# Patient Record
Sex: MALE | Employment: FULL TIME | URBAN - METROPOLITAN AREA
[De-identification: names, ages, dates, MRNs, and addresses within clinical notes are randomized per-mention and may not be internally consistent; named-entity substitution may affect disease eponyms.]

---

## 2017-06-23 DIAGNOSIS — Z02.5 SPORTS PHYSICAL: Primary | ICD-10-CM

## 2017-09-11 DIAGNOSIS — Z02.5 SPORTS PHYSICAL: Primary | ICD-10-CM

## 2017-09-12 ENCOUNTER — IMAGING SERVICES (OUTPATIENT)
Dept: GENERAL RADIOLOGY | Age: 22
End: 2017-09-12
Attending: INTERNAL MEDICINE

## 2017-09-12 ENCOUNTER — IMAGING SERVICES (OUTPATIENT)
Dept: CV DIAGNOSTICS | Age: 22
End: 2017-09-12
Attending: INTERNAL MEDICINE

## 2017-09-12 DIAGNOSIS — Z02.5 SPORTS PHYSICAL: ICD-10-CM

## 2017-09-12 LAB
AORTIC VALVE AREA: 4.3 CM2
ASCENDING AORTA (AAD): 3.3 CM
AV MEAN GRADIENT (AVMG): 2 MMHG
AV PEAK GRADIENT (AVPG): 3.7 MMHG
AV PEAK VELOCITY (AVPV): 1 M/S
DOP CALC LVOT PEAK VEL (LVOTPV): 0.8 M/S
E WAVE DECELARATION TIME (MDT): 188.5 MS
EST RIGHT VENT SYSTOLIC PRESSURE BY TRICUSPID REGURGITATION JET (RVSP): 21.3 MMHG
INTERVENTRICULAR SEPTUM IN END DIASTOLE (IVSD): 1 CM
LEFT INTERNAL DIMENSION IN SYSTOLE (LVSD): 3.7 CM
LEFT VENTRICULAR INTERNAL DIMENSION IN DIASTOLE (LVDD): 5.2 CM
LEFT VENTRICULAR POSTERIOR WALL IN END DIASTOLE (LVPW): 1 CM
LV EF: 58 %
LV END SYSTOLIC LONGITUDINAL STRAIN GLOBAL (LVGS): -15 %
LVOT 2D (LVOTD): 2.6 CM
LVOT VTI (LVOTVTI): 14.7 CM
MV E TISSUE VEL MED (MESV): 8.1 CM/S
MV E WAVE VEL/E TISSUE VEL MED(MSR): 5.3
MV PEAK A VELOCITY (MVPAV): 0.4 M/S
MV PEAK E VELOCITY (MVPEV): 0.4 M/S
RV TISSUE DOPPLER FREE WALL HEART RATE (RVSTV): 0.1 M/S
TRICUSPID VALVE PEAK REGURGITATION VELOCITY (TRPV): 2.1 M/S
TV ESTIMATED RIGHT ARTERIAL PRESSURE (RAP): 3 MMHG

## 2017-09-12 PROCEDURE — 93306 TTE W/DOPPLER COMPLETE: CPT | Performed by: INTERNAL MEDICINE

## 2017-09-12 PROCEDURE — 93351 STRESS TTE COMPLETE: CPT | Performed by: INTERNAL MEDICINE

## 2017-09-12 PROCEDURE — 0399T ECHO M-MODE/2D/DOPPLER (ROUTINE): CPT | Performed by: INTERNAL MEDICINE

## 2017-09-25 ENCOUNTER — OFFICE VISIT (OUTPATIENT)
Dept: SPORTS MEDICINE | Facility: CLINIC | Age: 22
End: 2017-09-25

## 2017-09-25 ENCOUNTER — HOSPITAL ENCOUNTER (OUTPATIENT)
Dept: CARDIOLOGY | Facility: CLINIC | Age: 22
Discharge: HOME | End: 2017-09-25

## 2017-09-25 ENCOUNTER — TELEPHONE (OUTPATIENT)
Dept: SPORTS MEDICINE | Facility: CLINIC | Age: 22
End: 2017-09-25

## 2017-09-25 ENCOUNTER — HOSPITAL ENCOUNTER (OUTPATIENT)
Dept: RADIOLOGY | Facility: HOSPITAL | Age: 22
Discharge: HOME | End: 2017-09-25
Attending: ORTHOPAEDIC SURGERY

## 2017-09-25 ENCOUNTER — OFFICE VISIT (OUTPATIENT)
Dept: CARDIOLOGY | Facility: CLINIC | Age: 22
End: 2017-09-25

## 2017-09-25 VITALS
BODY MASS INDEX: 27.02 KG/M2 | HEIGHT: 78 IN | HEART RATE: 72 BPM | DIASTOLIC BLOOD PRESSURE: 76 MMHG | WEIGHT: 233.56 LBS | SYSTOLIC BLOOD PRESSURE: 120 MMHG

## 2017-09-25 DIAGNOSIS — M25.562 LEFT KNEE PAIN, UNSPECIFIED CHRONICITY: ICD-10-CM

## 2017-09-25 DIAGNOSIS — Z86.79 HISTORY OF HEART MURMUR IN CHILDHOOD: ICD-10-CM

## 2017-09-25 DIAGNOSIS — Z02.5 ROUTINE SPORTS PHYSICAL EXAM: Primary | ICD-10-CM

## 2017-09-25 DIAGNOSIS — R01.1 MURMUR, CARDIAC: ICD-10-CM

## 2017-09-25 DIAGNOSIS — M25.562 LEFT KNEE PAIN, UNSPECIFIED CHRONICITY: Primary | ICD-10-CM

## 2017-09-25 DIAGNOSIS — I36.9 NONRHEUMATIC TRICUSPID VALVE DISORDER: ICD-10-CM

## 2017-09-25 DIAGNOSIS — Z02.5 ROUTINE SPORTS PHYSICAL EXAM: ICD-10-CM

## 2017-09-25 DIAGNOSIS — Z00.00 ROUTINE GENERAL MEDICAL EXAMINATION AT A HEALTH CARE FACILITY: Primary | ICD-10-CM

## 2017-09-25 DIAGNOSIS — E78.00 HYPERCHOLESTEROLEMIA: ICD-10-CM

## 2017-09-25 LAB
DIASTOLIC DYSFUNCTION: NO
ESTIMATED PA SYSTOLIC PRESSURE: 28.2
MITRAL VALVE REGURGITATION: NORMAL
RETIRED EF AND QEF - SEE NOTES: 63 (ref 55–65)
TRICUSPID VALVE REGURGITATION: NORMAL

## 2017-09-25 PROCEDURE — 93320 DOPPLER ECHO COMPLETE: CPT | Mod: 26,S$PBB,, | Performed by: INTERNAL MEDICINE

## 2017-09-25 PROCEDURE — 73721 MRI JNT OF LWR EXTRE W/O DYE: CPT | Mod: 26,LT,, | Performed by: RADIOLOGY

## 2017-09-25 PROCEDURE — 93325 DOPPLER ECHO COLOR FLOW MAPG: CPT | Mod: PBBFAC | Performed by: INTERNAL MEDICINE

## 2017-09-25 PROCEDURE — 99244 OFF/OP CNSLTJ NEW/EST MOD 40: CPT | Mod: S$PBB,,, | Performed by: INTERNAL MEDICINE

## 2017-09-25 PROCEDURE — 99204 OFFICE O/P NEW MOD 45 MIN: CPT | Mod: S$PBB,,, | Performed by: FAMILY MEDICINE

## 2017-09-25 PROCEDURE — 73564 X-RAY EXAM KNEE 4 OR MORE: CPT | Mod: 26,50,, | Performed by: RADIOLOGY

## 2017-09-25 PROCEDURE — 73564 X-RAY EXAM KNEE 4 OR MORE: CPT | Mod: TC,50,PO

## 2017-09-25 PROCEDURE — 99999 PR PBB SHADOW E&M-NEW PATIENT-LVL I: CPT | Mod: PBBFAC,,, | Performed by: ORTHOPAEDIC SURGERY

## 2017-09-25 PROCEDURE — 93351 STRESS TTE COMPLETE: CPT | Mod: 26,S$PBB,, | Performed by: INTERNAL MEDICINE

## 2017-09-25 PROCEDURE — 73721 MRI JNT OF LWR EXTRE W/O DYE: CPT | Mod: TC,LT

## 2017-09-25 PROCEDURE — 99201 *HC E&M-NEW PATIENT-LVL I: CPT | Mod: PBBFAC,25,27,PO | Performed by: ORTHOPAEDIC SURGERY

## 2017-09-25 PROCEDURE — 99999 PR PBB SHADOW E&M-EST. PATIENT-LVL III: CPT | Mod: PBBFAC,,, | Performed by: INTERNAL MEDICINE

## 2017-09-25 PROCEDURE — 99499 UNLISTED E&M SERVICE: CPT | Mod: S$PBB,,, | Performed by: ORTHOPAEDIC SURGERY

## 2017-09-25 PROCEDURE — 99213 OFFICE O/P EST LOW 20 MIN: CPT | Mod: PBBFAC,25,PO | Performed by: INTERNAL MEDICINE

## 2017-09-25 NOTE — PROGRESS NOTES
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  He played in the [employer] for [employer] and has been in the [employer]-League most recently.    He describes several issues with his left knee.  He missed 10 games when he was with [employer] in October 2015 with what he reports as a bone bruise from an MRI.  His knee was drained then and he was able to play the rest of the season.  In December 2016, he had swelling in that knee and saw Dr. Stephens in [place] and was told he had a cartilage injury and was given a PRP injection.  He was able to return to play following that.  He had some pain, swelling and popping this summer and saw Dr. Stephens again and got another MRI.  He was given a cortisone injection by his report and was told he could need surgery in the future to repair the cartilage which would keep him out 6-8 months.  He currently has no complaints and has been playing without issues.    ORTHOPAEDIC HISTORY:     1.  Left knee as reported above.      PHYSICAL EXAM:       GEN: Alert and oriented x3. In no apparent distress.   Well-developed, well-nourished male. Observation of ears, eyes, and nose   reveal no gross abnormalities.     CERVICAL SPINE: Full range of motion with no deficits.     BILATERAL SHOULDER EXAM: shows full symmetric range of motion with 5/5   strength throughout the supraspinatus and infraspinatus, deltoid, and   subscapularis. No evidence of instability.     BILATERAL ELBOW EXAM: Shows full and symmetric extension/flexion, and   pronation/supination, and varus/valgus stability. Negative posterolateral   rotatory instability.     BILATERAL WRIST EXAM: Shows normal and symmetric full flexion/extension   and radial/ulnar deviation.     HAND EXAM: Shows full range of motion to bilateral hands and full   strength and stability to all  fingers.     LUMBAR SPINE EXAM: Shows no evidence of any scoliosis. He has full   flexion and extension with no pain and no apparent tenderness to the   spine. Negative straight leg raise bilaterally.     HIP EXAM: Shows full range of motion without pain or signs of impingement.   Symmetric internal rotation without pain.  Has 5/5 hip flexion strength and 5/5 strength testing to the entire lower extremity.     KNEE EXAM: Examination of bilateral knees shows symmetric range of motion   0 to 145 degrees with negative Lachman and stable to varus-valgus stress   testing. No joint line tenderness. Good quad tone. No effusion of either knee.     FOOT AND ANKLE EXAM: Shows good range of motion to bilateral ankles with   no neurologic deficit. There is a 5/5 strength exam throughout the foot   and ankle exam. There is a normal arch on both feet. There is no tenderness to palpation along either feet.     IMAGING:     X-rays including standing, weight bearing AP and flexion bilateral knees, lateral and merchant views ordered and images reviewed by me show:    No fracture, dislocation or other pathology   Medial compartment: mild degenerative changes with subchondral possible OCD   Lateral compartment: no degenerative changes   Patellofemoral compartment: no degenerative changes        MRI of the left knee images reviewed by me, from today:  LFC partial to small full thickness cartilage injury with underlying bone edema  Degenerative fraying lateral meniscus         INACTIVE PROBLEMS:     None        ACTIVE PROBLEMS:     Left knee:  He has had recurrent issues with his left knee and has been seen by an outside orthopaedist for this as recently as this summer.  She recently underwent an injection in his left knee and has been told he could need surgery in the future if his symptoms return and/or worsen.  He is currently able to play and has been playing.        ASSESSMENT:     Ridge is given orthopaedic clearance for  participation for the [facility] for the 2014-15 Season.    With his left knee history, he is at risk for a recurrence or worsening of his left knee symptoms, which could involve him missing a short period of time or undergoing surgery and missing a significant amount of time.      Navarro Nixon MD

## 2017-09-25 NOTE — PROGRESS NOTES
"Subjective:   Patient ID:  Ridge Pinedo is a 21 y.o. 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        ROS    Objective: /76   Pulse 72   Ht 6' 8" (2.032 m)   Wt 106 kg (233 lb 9.2 oz)   BMI 25.66 kg/m²      Physical Exam   Constitutional: He is oriented to person, place, and time. He appears well-developed and well-nourished. No distress.   HENT:   Head: Normocephalic.   Eyes: EOM are normal. Pupils are equal, round, and reactive to light.   Neck: Normal range of motion. No thyromegaly present.   Cardiovascular: Normal rate, regular rhythm, normal heart sounds and intact distal pulses.  Exam reveals no gallop and no friction rub.    No murmur heard.  Pulses:       Carotid pulses are 3+ on the right side, and 3+ on the left side.       Radial pulses are 3+ on the right side, and 3+ on the left side.        Femoral pulses are 3+ on the right side, and 3+ on the left side.       Popliteal pulses are 3+ on the right side, and 3+ on the left side.        Dorsalis pedis pulses are 3+ on the right side, and 3+ on the left side.        Posterior tibial pulses are 3+ on the right side, and 3+ on the left side.   Pulmonary/Chest: Effort normal and breath sounds normal. No respiratory distress. He has no wheezes. He has no rales. He exhibits no tenderness.   Abdominal: Soft. He exhibits no distension and no mass. There is no tenderness.   Musculoskeletal: Normal range of motion.   Lymphadenopathy:     He has no cervical adenopathy.   Neurological: He is alert and oriented to person, place, and time.   Skin: Skin is warm. He is not diaphoretic. No cyanosis. Nails show no clubbing.   Psychiatric: He has a normal mood and affect. His speech is normal and behavior is normal. " Judgment and thought content normal. Cognition and memory are normal.       Assessment:     1. Murmur, cardiac    2. Hypercholesterolemia        Plan:   Discussed diet , achieving and maintaining ideal body weight, and exercise.   We reviewed meds in detail.  Reassured that I do not hear murmur now and echo completely normal.    Ridge was seen today for annual exam.    Diagnoses and all orders for this visit:    Murmur, cardiac    Hypercholesterolemia            No Follow-up on file.Follow prn pt or primary.Will text Charlie.

## 2017-09-25 NOTE — LETTER
September 25, 2017      Navarro Nixon MD  [address]  [facility] [address]           [place] [facility]  [address]  [place] [address]  Phone: [phone]          Patient: Ridge Pinedo   MR Number: [id number]   YOB: 1995   Date of Visit: 9/25/2017       Dear Dr. Navarro Nixon:    Thank you for referring Ridge Pinedo to me for evaluation. Attached you will find relevant portions of my assessment and plan of care.    If you have questions, please do not hesitate to call me. I look forward to following Ridge Pinedo along with you.    Sincerely,    José Carson MD    Enclosure  CC:  No Recipients    If you would like to receive this communication electronically, please contact [email or web address] or [phone] to request more information on [employer] access.    For providers and/or their staff who would like to refer a patient to [facility], please contact us through our one-stop-shop provider referral line, [facility], at [phone].    If you feel you have received this communication in error or would no longer like to receive these types of communications, please e-mail [email or web address]

## 2017-09-25 NOTE — PROGRESS NOTES
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      Assessment:   #1 pre season medical evaluation   #2 ongoing medical concerns:    A) history of 10 games missed during high school basketball subsequent to "heart murmur"    Imaging studies reviewed:   none     Plan:     Ridge Pinedo is CLEARED for participation with [facility]    To do:  stress echocardiogram per [facility] protocol  F/u w/ Dr. Carson to discuss the HS games m     Physical exam, routine Z00.00      Reviewed 17.07.19    ADDENDUM:  1636 17.09.25  Communication from cardiology: no murmur, normal stress echo, no contraindication to sports participation  "

## 2017-09-25 NOTE — PATIENT INSTRUCTIONS
Discussed diet , achieving and maintaining ideal body weight, and exercise.   We reviewed meds in detail.  Reassured that I do not hear murmur now and echo completely normal.

## 2018-07-01 ENCOUNTER — TELEPHONE (OUTPATIENT)
Dept: SPORTS MEDICINE | Facility: CLINIC | Age: 23
End: 2018-07-01

## 2018-07-01 DIAGNOSIS — Z02.5 ROUTINE SPORTS PHYSICAL EXAM: Primary | ICD-10-CM

## 2018-07-02 ENCOUNTER — OFFICE VISIT (OUTPATIENT)
Dept: SPORTS MEDICINE | Facility: CLINIC | Age: 23
End: 2018-07-02

## 2018-07-02 DIAGNOSIS — Z02.5 ROUTINE SPORTS PHYSICAL EXAM: ICD-10-CM

## 2018-07-02 PROCEDURE — 99203 OFFICE O/P NEW LOW 30 MIN: CPT | Mod: S$PBB,,, | Performed by: ORTHOPAEDIC SURGERY

## 2018-07-02 PROCEDURE — 93010 ELECTROCARDIOGRAM REPORT: CPT | Mod: S$PBB,,, | Performed by: INTERNAL MEDICINE

## 2018-07-02 PROCEDURE — 93005 ELECTROCARDIOGRAM TRACING: CPT | Mod: PBBFAC | Performed by: INTERNAL MEDICINE

## 2018-07-02 NOTE — PROGRESS NOTES
[facility] Pre-particpation physical    22 y.o. year-old [facility]       RECENT HISTORY:   1. No current or recent symptoms    Orthopaedic history:  L knee pain, PRP and cortisone injection x 2, last injection summer 2017, no current issues, cartilage defect laterally  R ankle sprain 5/2018     PHYSICAL EXAM:      GEN: Alert and oriented x3. In no apparent distress.     Well-developed, well-nourished male. Observation of ears, eyes, and nose   reveal no gross abnormalities.  See ophthal report for full eye exam    CERVICAL SPINE: Full range of motion with no deficits.     BILATERAL SHOULDER EXAM: shows full symmetric range of motion with 5/5   strength throughout the supraspinatus and infraspinatus, deltoid, and   subscapularis. No evidence of instability.     BILATERAL ELBOW EXAM: Shows full and symmetric extension/flexion, and   pronation/supination, and varus/valgus stability. Negative posterolateral   rotatory instability.     BILATERAL WRIST EXAM: Shows normal and symmetric full flexion/extension   and radial/ulnar deviation.     HAND EXAM: Shows full range of motion to bilateral hands and full   strength and stability to all fingers.     LUMBAR SPINE EXAM: Shows no evidence of any scoliosis. He has full   flexion and extension with no pain and no apparent tenderness to the   spine. Negative straight leg raise bilaterally.     HIP EXAM: Shows full range of motion without pain or signs of impingement.   Symmetric internal rotation without pain.  Has 5/5 hip flexion strength and 5/5 strength testing to the entire lower extremity. ROM- L: flexion 135, IR c 25, s 30, ER 60; R: flexion 135, IR c 20, s 25, ER 60    KNEE EXAM: Examination of bilateral knees shows symmetric range of motion   0 to 145 degrees with negative Lachman and stable to varus-valgus stress   testing. No joint line tenderness. Good quad tone. No effusion of either knee. B popliteal angle 5    Bilateral legs: NTTP over tibiae mid shaft  or distal.      FOOT AND ANKLE EXAM: Shows good range of motion to bilateral ankles with   no neurologic deficit. There is a 5/5 strength exam throughout the foot   and ankle exam. There is a normal arch on both feet. There is no tenderness to palpation along either foot. - turf toe exam      IMAGING:   None today    ASSESSMENT:   Orthopedically cleared for participation for the [place] [facility] summer league 2018

## 2018-07-03 ENCOUNTER — OFFICE VISIT (OUTPATIENT)
Dept: INTERNAL MEDICINE | Facility: CLINIC | Age: 23
End: 2018-07-03
Attending: FAMILY MEDICINE

## 2018-07-03 VITALS
DIASTOLIC BLOOD PRESSURE: 84 MMHG | WEIGHT: 240 LBS | HEIGHT: 78 IN | BODY MASS INDEX: 27.77 KG/M2 | SYSTOLIC BLOOD PRESSURE: 129 MMHG

## 2018-07-03 DIAGNOSIS — Z00.00 ANNUAL PHYSICAL EXAM: Primary | ICD-10-CM

## 2018-07-03 DIAGNOSIS — R01.1 MURMUR, CARDIAC: ICD-10-CM

## 2018-07-03 PROCEDURE — 99395 PREV VISIT EST AGE 18-39: CPT | Mod: S$PBB,,, | Performed by: FAMILY MEDICINE

## 2018-07-03 PROCEDURE — 99999 PR PBB SHADOW E&M-EST. PATIENT-LVL II: CPT | Mod: PBBFAC,,, | Performed by: FAMILY MEDICINE

## 2018-07-03 PROCEDURE — 99212 OFFICE O/P EST SF 10 MIN: CPT | Mod: PBBFAC | Performed by: FAMILY MEDICINE

## 2018-07-03 NOTE — PROGRESS NOTES
Subjective:       Patient ID: Ridge Pinedo is a 22 y.o. male.    Chief Complaint: No chief complaint on file.    Established patient for an annual wellness check/physical exam. No specific complaints, please see ROS. Preseason [employer] clearance physical. 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      Review of Systems   Constitutional: Positive for fatigue. Negative for chills, fever and unexpected weight change.   HENT: Negative for congestion and trouble swallowing.    Eyes: Negative for redness and visual disturbance.   Respiratory: Negative for cough, chest tightness and shortness of breath.    Cardiovascular: Negative for chest pain, palpitations and leg swelling.   Gastrointestinal: Negative for abdominal pain and blood in stool.   Genitourinary: Negative for difficulty urinating and hematuria.   Musculoskeletal: Negative for arthralgias, back pain, gait problem, joint swelling, myalgias and neck pain.   Skin: Negative for color change and rash.   Neurological: Negative for tremors, speech difficulty, weakness, numbness and headaches.   Hematological: Negative for adenopathy. Does not bruise/bleed easily.   Psychiatric/Behavioral: Negative for behavioral problems, confusion and sleep disturbance. The patient is not nervous/anxious.        Objective:      Physical Exam   Constitutional: He is oriented to person, place, and time. He appears well-developed and well-nourished.   HENT:   Head: Normocephalic and atraumatic.   Eyes: Conjunctivae are normal. No scleral icterus.   Neck: Normal range of motion. Neck supple.   Cardiovascular: Normal rate, regular rhythm, normal heart sounds and intact distal pulses.  Exam reveals no gallop and no friction rub.    No murmur heard.  Pulmonary/Chest: Effort normal and breath sounds normal. No respiratory distress. He has no wheezes. He has no rales.   Abdominal: He exhibits no distension. There is no tenderness.   Musculoskeletal: He exhibits no edema  or deformity.   Neurological: He is alert and oriented to person, place, and time. He displays no tremor. No cranial nerve deficit. Coordination and gait normal.   Skin: Skin is warm and dry. No rash noted. He is not diaphoretic. No erythema.   Psychiatric: He has a normal mood and affect. His behavior is normal. Judgment and thought content normal.   Nursing note and vitals reviewed.      Assessment:       1. Annual physical exam    2. Murmur, cardiac        Plan:   Diagnoses and all orders for this visit:    Annual physical exam    Murmur, cardiac  Comments:  childhood by history - evaluated and cleared by Dr. Carson in 9/2017      See meds, orders, follow up, routing and instructions sections of encounter.  A 22-year-old [facility] physical examination for summer league.  The patient's [facility]   medical history questionnaire was reviewed.  The patient answered positive to a   cardiac murmur age 16 or 17, found by his regular doctor, which was worked up   and return to play.  States he had cardiovascular evaluation in 2015 [employer].  He also answered positive to fatigue or being out of shape.  There is   nothing to suggest cardiovascular disease; however, such as syncope, near   syncope, chest pain or significant dyspnea.  Family history was negative for   cardiovascular diseases.    The patient states he was diagnosed with ADD in the sixth grade, but never took   any medications and does not take any medications at this time.  I reviewed the   data from his September 2017 screen from Dr. Carson and his echocardiogram and no   adverse findings were noted.      JOB/HN  dd: 07/03/2018 11:06:47 (CDT)  td: 07/04/2018 08:20:44 (CDT)  Doc ID   #[id number]  Job ID #[id number]    CC:              See meds, orders, follow up and instructions sections of encounter.  Clear for play.